# Patient Record
(demographics unavailable — no encounter records)

---

## 2024-10-09 NOTE — ASSESSMENT
[FreeTextEntry1] : R shoulder pain with large cuff tear.  MRI right shoulder ([facility]) 5/29/24 - large retracted supra/ infra tears, partial subscap Now s/p SA, SAD, acromioplasty, GHD, synovectomy, RCR. Now s/p SA, SAD, acromioplasty, GHD, synovectomy, RCR. PT for PROM, AROM as tolerated. Strengthening as tolerated. WB limit 20 lbs. She is OOW.   RTO 6 weeks - may consider RTW pending progression.

## 2024-10-09 NOTE — HISTORY OF PRESENT ILLNESS
[8] : 8 [3] : 3 [Dull/Aching] : dull/aching [Sharp] : sharp [de-identified] : DOS 7/1/24 - s/p R SA, SAD, acromioplasty, GHD, synovectomy, RCR.  10/9/24:  follow up right shoulder.  improving with pt.  mild pain.   8/28/24: Here for follow up.  She is in PT.  She is stiff.    7/31/24: Here for follow up. She is improving, but pain with sleeping.   7/17/24:  1st PO visit.  Now 2 weeks out.  Doing well in sling.   Mild pain.  No fevers or chills.   6/19/24:  This is a 62 yo RHD female who presents with acute onset of right shoulder pain after a fall down the stairs on 5/23/24.  She was seen at [facility]  where xrays taken and referred for mri.  She went to see Dr TABARES who recommended surgery but she is here for second opinion.   So far she has had one session of PT and has been takin mobic prn with little relief.  Pain continues to persist.  Worse with reaching, at night and with lifting.  No shooting pains.  No numbness or tingling.   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

## 2024-10-09 NOTE — PHYSICAL EXAM
[Right] : right shoulder [4 ___] : forward flexion 4[unfilled]/5 [] : motor and sensory intact distally [FreeTextEntry9] : FE: 130A 140P ER: 30

## 2024-11-20 NOTE — HISTORY OF PRESENT ILLNESS
[8] : 8 [3] : 3 [Dull/Aching] : dull/aching [Sharp] : sharp [de-identified] : DOS 7/1/24 - s/p R SA, SAD, acromioplasty, GHD, synovectomy, RCR.  11/20/24 - pt is here for right shoulder, pt stated PT. still some stiffness that occurs. overall getting better and improving.  she is happy with her progress.   10/9/24:  follow up right shoulder.  improving with pt.  mild pain.   8/28/24: Here for follow up.  She is in PT.  She is stiff.    7/31/24: Here for follow up. She is improving, but pain with sleeping.   7/17/24:  1st PO visit.  Now 2 weeks out.  Doing well in sling.   Mild pain.  No fevers or chills.   6/19/24:  This is a 62 yo RHD female who presents with acute onset of right shoulder pain after a fall down the stairs on 5/23/24.  She was seen at [facility] where xrays taken and referred for mri.  She went to see Dr TABARES who recommended surgery but she is here for second opinion.   So far she has had one session of PT and has been takin mobic prn with little relief.  Pain continues to persist.  Worse with reaching, at night and with lifting.  No shooting pains.  No numbness or tingling.   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

## 2024-11-20 NOTE — ASSESSMENT
[FreeTextEntry1] : R shoulder pain with large cuff tear.  MRI right shoulder ([facility]) 5/29/24 - large retracted supra/ infra tears, partial subscap Now s/p SA, SAD, acromioplasty, GHD, synovectomy, RCR. PT for PROM, AROM as tolerated. Strengthening as tolerated. WB limit 20 lbs. She can RTW 12/20/24.   RTO 2 months - may consider RTW pending progression.    PLEASE LET THIS NOTE SERVE AS A LETTER OF MEDICAL NECESSITY FOR CONTINUED PHYSICAL THERAPY.  The patient lacks motion and strength and would benefit from continued formal physical therapy.

## 2024-11-20 NOTE — PHYSICAL EXAM
[Right] : right shoulder [4 ___] : forward flexion 4[unfilled]/5 [] : no erythema [FreeTextEntry9] : FE: 145 ER: 50

## 2025-02-06 NOTE — PHYSICAL EXAM
[Right] : right shoulder [4 ___] : forward flexion 4[unfilled]/5 [] : strength is improving [4___] : external rotation 4[unfilled]/5 [FreeTextEntry9] : FE: 160 ER: 50

## 2025-02-06 NOTE — HISTORY OF PRESENT ILLNESS
[Dull/Aching] : dull/aching [Sharp] : sharp [1] : 2 [0] : 0 [de-identified] : DOS 7/1/24 - s/p R SA, SAD, acromioplasty, GHD, synovectomy, RCR.  2/6/25: Here for follow up. She reports doing well. She is back to work. She stopped PT in December and now does HEP.  11/20/24 - pt is here for right shoulder, pt stated PT. still some stiffness that occurs. overall getting better and improving.  she is happy with her progress.   10/9/24:  follow up right shoulder.  improving with pt.  mild pain.   8/28/24: Here for follow up.  She is in PT.  She is stiff.    7/31/24: Here for follow up. She is improving, but pain with sleeping.   7/17/24:  1st PO visit.  Now 2 weeks out.  Doing well in sling.   Mild pain.  No fevers or chills.   6/19/24:  This is a 62 yo RHD female who presents with acute onset of right shoulder pain after a fall down the stairs on 5/23/24.  She was seen at [facility] where xrays taken and referred for mri.  She went to see Dr TABARES who recommended surgery but she is here for second opinion.   So far she has had one session of PT and has been takin mobic prn with little relief.  Pain continues to persist.  Worse with reaching, at night and with lifting.  No shooting pains.  No numbness or tingling.   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 Minimal pain [de-identified] : 7/1/24 [de-identified] : pt

## 2025-02-06 NOTE — ASSESSMENT
[FreeTextEntry1] : R shoulder pain with large cuff tear.  MRI right shoulder ([facility]) 5/29/24 - large retracted supra/ infra tears, partial subscap Now s/p SA, SAD, acromioplasty, GHD, synovectomy, RCR. Doing great. Continue HEP. RTO 6 months.

## 2025-07-03 NOTE — PHYSICAL EXAM
[Right] : right shoulder [5 ___] : forward flexion 5[unfilled]/5 [5___] : external rotation 5[unfilled]/5 [] : discomfort with strength testing [FreeTextEntry9] : FE: 160 ER: 50

## 2025-07-03 NOTE — ASSESSMENT
[FreeTextEntry1] : R shoulder pain with large cuff tear.  MRI right shoulder ([facility]) 5/29/24 - large retracted supra/ infra tears, partial subscap Now s/p SA, SAD, acromioplasty, GHD, synovectomy, RCR. Doing great. Continue HEP. RTO prn.  She will see Dr. Gilliam for the lumbar spine.

## 2025-07-03 NOTE — HISTORY OF PRESENT ILLNESS
[1] : 2 [0] : 0 [Dull/Aching] : dull/aching [Sharp] : sharp [de-identified] : DOS 7/1/24 - s/p R SA, SAD, acromioplasty, GHD, synovectomy, RCR.  7/3/2025:  Here for follow up.  She is overall doing well, occasional pain with weather and humidity.   2/6/25: Here for follow up. She reports doing well. She is back to work. She stopped PT in December and now does HEP.  11/20/24 - pt is here for right shoulder, pt stated PT. still some stiffness that occurs. overall getting better and improving.  she is happy with her progress.   10/9/24:  follow up right shoulder.  improving with pt.  mild pain.   8/28/24: Here for follow up.  She is in PT.  She is stiff.    7/31/24: Here for follow up. She is improving, but pain with sleeping.   7/17/24:  1st PO visit.  Now 2 weeks out.  Doing well in sling.   Mild pain.  No fevers or chills.   6/19/24:  This is a 62 yo RHD female who presents with acute onset of right shoulder pain after a fall down the stairs on 5/23/24.  She was seen at [facility] where xrays taken and referred for mri.  She went to see Dr TABARES who recommended surgery but she is here for second opinion.   So far she has had one session of PT and has been takin mobic prn with little relief.  Pain continues to persist.  Worse with reaching, at night and with lifting.  No shooting pains.  No numbness or tingling.   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 Minimal pain [de-identified] : 7/1/24 [de-identified] : pt

## 2025-07-21 NOTE — PHYSICAL EXAM
[] : motor exam is non-focal throughout both lower extremities [TWNoteComboBox7] : forward flexion 75 degrees [de-identified] : extension 20 degrees

## 2025-07-21 NOTE — ASSESSMENT
[FreeTextEntry1] : After discussing various treatment options with the patient including but not limited to oral medications, physical therapy, exercise, modalities as well as interventional spinal injections, we have decided with the following plan:  1) A MRI is indicated as there has been failure of numerous conservative therapies over the last 6-8 weeks. these include but are not limited to medication therapy and physical therapy. It is recognized that repeat imaging studies and other tests may be warranted by the clinical course and/or to follow the progress of treatment in some cases. It may be of value to repeat diagnostic procedures (ie imaging studies) during the course of care to reassess or stage the pathology when there is progression of symptoms or findings, prior to surgical interventions and/or therapeutic injections when clinically indicated.   MRI lumbar spine- sounds S1   2) I would recommend a trial of neuropathic medication as patient presents with signs of nerve irritation. (ie burning, paresthesias etc) Goals of therapy would be to improve pain and overall QOL. Side effects reviewed with patient. Patient will call or stop medication if given side effects occur.  cymbalta.
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
